# Patient Record
Sex: FEMALE | Race: WHITE | NOT HISPANIC OR LATINO | Employment: UNEMPLOYED | URBAN - METROPOLITAN AREA
[De-identification: names, ages, dates, MRNs, and addresses within clinical notes are randomized per-mention and may not be internally consistent; named-entity substitution may affect disease eponyms.]

---

## 2020-12-01 ENCOUNTER — HOSPITAL ENCOUNTER (OUTPATIENT)
Facility: MEDICAL CENTER | Age: 30
End: 2020-12-01
Attending: NURSE PRACTITIONER
Payer: MEDICAID

## 2020-12-01 ENCOUNTER — OFFICE VISIT (OUTPATIENT)
Dept: URGENT CARE | Facility: CLINIC | Age: 30
End: 2020-12-01
Payer: MEDICAID

## 2020-12-01 VITALS
HEIGHT: 63 IN | RESPIRATION RATE: 16 BRPM | TEMPERATURE: 97 F | OXYGEN SATURATION: 98 % | DIASTOLIC BLOOD PRESSURE: 88 MMHG | SYSTOLIC BLOOD PRESSURE: 122 MMHG | BODY MASS INDEX: 23.92 KG/M2 | WEIGHT: 135 LBS | HEART RATE: 89 BPM

## 2020-12-01 DIAGNOSIS — R43.2 LOSS OF TASTE: ICD-10-CM

## 2020-12-01 DIAGNOSIS — H92.03 OTALGIA OF BOTH EARS: ICD-10-CM

## 2020-12-01 DIAGNOSIS — Z20.822 SUSPECTED COVID-19 VIRUS INFECTION: ICD-10-CM

## 2020-12-01 DIAGNOSIS — R09.81 NASAL CONGESTION: ICD-10-CM

## 2020-12-01 PROCEDURE — U0003 INFECTIOUS AGENT DETECTION BY NUCLEIC ACID (DNA OR RNA); SEVERE ACUTE RESPIRATORY SYNDROME CORONAVIRUS 2 (SARS-COV-2) (CORONAVIRUS DISEASE [COVID-19]), AMPLIFIED PROBE TECHNIQUE, MAKING USE OF HIGH THROUGHPUT TECHNOLOGIES AS DESCRIBED BY CMS-2020-01-R: HCPCS

## 2020-12-01 PROCEDURE — 99203 OFFICE O/P NEW LOW 30 MIN: CPT | Mod: CS | Performed by: NURSE PRACTITIONER

## 2020-12-01 RX ORDER — FLUTICASONE PROPIONATE 50 MCG
1 SPRAY, SUSPENSION (ML) NASAL DAILY
Qty: 16 G | Refills: 0 | Status: SHIPPED | OUTPATIENT
Start: 2020-12-01 | End: 2021-08-16

## 2020-12-01 RX ORDER — IBUPROFEN 800 MG/1
800 TABLET ORAL EVERY 8 HOURS PRN
Qty: 30 TAB | Refills: 0 | Status: SHIPPED | OUTPATIENT
Start: 2020-12-01 | End: 2021-08-16

## 2020-12-01 ASSESSMENT — ENCOUNTER SYMPTOMS
MYALGIAS: 0
SHORTNESS OF BREATH: 0
FEVER: 0
NECK PAIN: 0
SORE THROAT: 1
RHINORRHEA: 1
NAUSEA: 0
HEADACHES: 1
VOMITING: 0
CHILLS: 0
COUGH: 1
DIZZINESS: 0
EYE REDNESS: 0

## 2020-12-01 NOTE — LETTER
December 1, 2020         Patient: Toan Berkowitz   YOB: 1990   Date of Visit: 12/1/2020           To Whom it May Concern:     Your employee was seen in our clinic today.  A concern for COVID-19 has been identified and testing is in progress.     We are asking you to excuse absences while following self-isolation protocol per Center for Disease Control (CDC) guidelines.  Your employee will be able to access test results through our electronic delivery system called [employer].     If the results of testing are negative, and once there has been no fever (temperature >100.4 F) for at least 72 hours without treatment, and no vomiting or diarrhea for at least 48 hours, then return to work is approved.    If the results of testing are positive then your employee will be contacted by the [facility] or [facility] department for further instructions on duration of self-isolation and return to work protocol. In general, this will also follow the CDC guidelines with a minimum of 10 days from the onset of symptoms and without fever, vomiting, or diarrhea as above.     In general, repeat testing is not necessary and not offered through our [facility].     This is the only note that will be provided from [facility] for this visit.  Your employee will require an appointment with a primary care provider if FMLA or disability forms are required.         If you have any questions please do not hesitate to call me at the phone number listed below.    Sincerely,      Dejan Lucas APRN  [phone]

## 2020-12-02 DIAGNOSIS — R09.81 NASAL CONGESTION: ICD-10-CM

## 2020-12-02 DIAGNOSIS — Z20.822 SUSPECTED COVID-19 VIRUS INFECTION: ICD-10-CM

## 2020-12-02 LAB
COVID ORDER STATUS COVID19: NORMAL
SARS-COV-2 RNA RESP QL NAA+PROBE: DETECTED
SPECIMEN SOURCE: ABNORMAL

## 2020-12-02 NOTE — PROGRESS NOTES
Subjective:   Toan Berkowitz is a 30 y.o. female who presents for Ear Pain ( double ear infection x2 days, runny nose, wet cough, no exposure)      URI   This is a new problem. Episode onset: x2 days  The problem has been unchanged. There has been no fever. Associated symptoms include congestion, coughing, ear pain, headaches, rhinorrhea and a sore throat. Pertinent negatives include no chest pain, dysuria, nausea, neck pain, rash or vomiting. Associated symptoms comments: Loss of taste. She has tried acetaminophen for the symptoms. The treatment provided no relief.       Review of Systems   Constitutional: Positive for malaise/fatigue. Negative for chills and fever.   HENT: Positive for congestion, ear pain, rhinorrhea and sore throat. Negative for ear discharge.         Loss of taste   Eyes: Negative for redness.   Respiratory: Positive for cough. Negative for shortness of breath.    Cardiovascular: Negative for chest pain.   Gastrointestinal: Negative for nausea and vomiting.   Genitourinary: Negative for dysuria.   Musculoskeletal: Negative for myalgias and neck pain.   Skin: Negative for rash.   Neurological: Positive for headaches. Negative for dizziness.       Medications:    • DAILY MULTIVITAMIN PO  • docusate sodium Caps  • ondansetron Tabs    Allergies: Vicodin [hydrocodone-acetaminophen]    Problem List: Toan Berkowitz has Closed bimalleolar fracture on their problem list.    Surgical History:  Past Surgical History:   Procedure Laterality Date   • ANKLE ORIF Left 6/12/2015    Procedure: ANKLE ORIF for: bimalleo fracture ;  Surgeon: Jan Henderson M.D.;  Location: SURGERY [facility];  Service:        Past Social Hx: Toan Berkowitz  reports that she has been smoking cigarettes. She has been smoking about 0.50 packs per day. She has never used smokeless tobacco. She reports current alcohol use. She reports that she does not use drugs.     Past Family Hx:  Toan Berkowitz family  "history is not on file.     Problem list, medications, and allergies reviewed by myself today in Epic.     Objective:     /88   Pulse 89   Temp 36.1 °C (97 °F) (Temporal)   Resp 16   Ht 1.6 m (5' 3\")   Wt 61.2 kg (135 lb)   SpO2 98%   BMI 23.91 kg/m²     Physical Exam  Vitals signs and nursing note reviewed.   Constitutional:       General: She is not in acute distress.     Appearance: She is well-developed.   HENT:      Head: Normocephalic and atraumatic.      Right Ear: External ear normal. Tympanic membrane is not erythematous or bulging.      Left Ear: External ear normal. Tympanic membrane is not erythematous or bulging.      Nose: Nose normal.      Mouth/Throat:      Mouth: Mucous membranes are moist.   Eyes:      Conjunctiva/sclera: Conjunctivae normal.   Cardiovascular:      Rate and Rhythm: Normal rate.   Pulmonary:      Effort: Pulmonary effort is normal. No respiratory distress.      Breath sounds: Normal breath sounds.   Abdominal:      General: There is no distension.   Musculoskeletal: Normal range of motion.   Skin:     General: Skin is warm and dry.   Neurological:      General: No focal deficit present.      Mental Status: She is alert and oriented to person, place, and time. Mental status is at baseline.      Gait: Gait (gait at baseline) normal.   Psychiatric:         Judgment: Judgment normal.         Assessment/Plan:     Diagnosis and associated orders:   1. Nasal congestion  COVID/SARS COV-2 PCR    fluticasone (FLONASE) 50 MCG/ACT nasal spray   2. Suspected COVID-19 virus infection  COVID/SARS COV-2 PCR   3. Loss of taste     4. Otalgia of both ears  ibuprofen (MOTRIN) 800 MG Tab        Comments/MDM:     .  Patient will be tested for COVID-19 at this time  Provided patient with self-isolation instructions  Provided ER precautions including worsening shortness of breath and high fever  Stressed the seriousness of isolation and prevention of transmissible disease  Differential " diagnosis, natural history, supportive care, and indications for immediate follow-up discussed.             Please note that this dictation was created using voice recognition software. I have made a reasonable attempt to correct obvious errors, but I expect that there are errors of grammar and possibly content that I did not discover before finalizing the note.    This note was electronically signed by Dejan JACKSON.

## 2021-08-16 ENCOUNTER — HOSPITAL ENCOUNTER (EMERGENCY)
Facility: MEDICAL CENTER | Age: 31
End: 2021-08-16
Attending: EMERGENCY MEDICINE
Payer: MEDICAID

## 2021-08-16 VITALS
WEIGHT: 216.05 LBS | HEIGHT: 62 IN | OXYGEN SATURATION: 95 % | RESPIRATION RATE: 14 BRPM | TEMPERATURE: 97.1 F | HEART RATE: 90 BPM | DIASTOLIC BLOOD PRESSURE: 99 MMHG | SYSTOLIC BLOOD PRESSURE: 171 MMHG | BODY MASS INDEX: 39.76 KG/M2

## 2021-08-16 DIAGNOSIS — R20.2 PARESTHESIAS: ICD-10-CM

## 2021-08-16 PROCEDURE — 99282 EMERGENCY DEPT VISIT SF MDM: CPT

## 2021-08-16 NOTE — ED TRIAGE NOTES
"Chief Complaint   Patient presents with   • Numbness     pt states both of her hands became numb and started to tingle today when she woke up. pt states she started a new job where she is typing and resting her elbows a lot     Pt ambulatory into triage room. Pt does not present w/ any unilateral deficits. Pt denies being dehydrated, but states she was drinking this weekend. Pt states the numbness in both hands has gotten better since walking in. Educated pt on triage process and to notify if there is any change.      /110   Pulse 91   Temp 36.2 °C (97.2 °F) (Temporal)   Resp 16   Ht 1.575 m (5' 2\")   Wt 98 kg (216 lb 0.8 oz)   LMP 07/01/2021 (Approximate)   SpO2 94%   BMI 39.52 kg/m²     "

## 2021-08-17 NOTE — ED PROVIDER NOTES
"ED Provider Note    CHIEF COMPLAINT  Chief Complaint   Patient presents with   • Numbness     pt states both of her hands became numb and started to tingle today when she woke up. pt states she started a new job where she is typing and resting her elbows a lot       HPI  Toan Berkowitz is a 31 y.o. female who presents with bilateral hand tingling.  The patient states when she woke today she started having tingling in both of her hands and felt like her thumbs were flexing bilaterally.  Subsequently she developed some tingling around her face and numbness in her face.  She does not have a headache.  She states she has been at a new job where she is been on her elbows a lot and doing a lot of typing.  Patient states she is also had some right knee pain after hitting her right knee on a rock about a week ago.  States the pain seems to be improving.  She states the pain feels deep in the right knee but does seem to be improving.  The patient has not had any associated fevers.  She has not had any vomiting.  She has had some slight diarrhea.  She is unaware of any sick contacts.    REVIEW OF SYSTEMS  See HPI for further details. All other systems are negative.     PAST MEDICAL HISTORY  Past Medical History:   Diagnosis Date   • Pain 06-11-12    left ankle, 9/10   • Headache(784.0)    • Pleurisy     6 weeks ago       FAMILY HISTORY  [unfilled]    SOCIAL HISTORY  Social History     Socioeconomic History   • Marital status: Single     Spouse name: Not on file   • Number of children: Not on file   • Years of education: Not on file   • Highest education level: Not on file   Occupational History   • Not on file   Tobacco Use   • Smoking status: Current Every Day Smoker     Packs/day: 0.50     Types: Cigarettes   • Smokeless tobacco: Never Used   Vaping Use   • Vaping Use: Never used   Substance and Sexual Activity   • Alcohol use: Yes     Comment: \"socially\"   • Drug use: No     Comment: history of meth   • Sexual " "activity: Not on file   Other Topics Concern   • Not on file   Social History Narrative   • Not on file     Social Determinants of Health     Financial Resource Strain:    • Difficulty of Paying Living Expenses:    Food Insecurity:    • Worried About Running Out of Food in the Last Year:    • Ran Out of Food in the Last Year:    Transportation Needs:    • Lack of Transportation (Medical):    • Lack of Transportation (Non-Medical):    Physical Activity:    • Days of Exercise per Week:    • Minutes of Exercise per Session:    Stress:    • Feeling of Stress :    Social Connections:    • Frequency of Communication with Friends and Family:    • Frequency of Social Gatherings with Friends and Family:    • Attends Gn[employer] Services:    • Active Member of Clubs or Organizations:    • Attends Club or Organization Meetings:    • Marital Status:    Intimate Partner Violence:    • Fear of Current or Ex-Partner:    • Emotionally Abused:    • Physically Abused:    • Sexually Abused:        SURGICAL HISTORY  Past Surgical History:   Procedure Laterality Date   • ANKLE ORIF Left 6/12/2015    Procedure: ANKLE ORIF for: bimalleo fracture ;  Surgeon: Jan Henderson M.D.;  Location: SURGERY [facility];  Service:        CURRENT MEDICATIONS  Home Medications     Reviewed by Kim Callahan R.N. (Registered Nurse) on 08/16/21 at 1358  Med List Status: Complete   Medication Last Dose Status        Patient Jareth Taking any Medications                       ALLERGIES  Allergies   Allergen Reactions   • Vicodin [Hydrocodone-Acetaminophen] Vomiting and Nausea       PHYSICAL EXAM  VITAL SIGNS: BP (!) 171/99   Pulse 90   Temp 36.2 °C (97.1 °F) (Temporal)   Resp 14   Ht 1.575 m (5' 2\")   Wt 98 kg (216 lb 0.8 oz)   LMP 07/01/2021 (Approximate)   SpO2 95%   BMI 39.52 kg/m²       Constitutional: Anxious but nontoxic  HENT: Normocephalic, Atraumatic, Bilateral external ears normal, Oropharynx moist, No oral exudates, Nose normal. "   Eyes: PERRLA, EOMI, Conjunctiva normal, No discharge.   Neck: Normal range of motion, No tenderness, Supple, No stridor.   Lymphatic: No lymphadenopathy noted.   Cardiovascular: Normal heart rate, Normal rhythm, No murmurs, No rubs, No gallops.   Thorax & Lungs: Normal breath sounds, No respiratory distress, No wheezing, No chest tenderness.   Abdomen: Bowel sounds normal, Soft, No tenderness, No masses, No pulsatile masses.   Skin: Warm, Dry, No erythema, No rash.   Back: No tenderness, No CVA tenderness.   Extremities: Intact distal pulses, No edema, No tenderness, No cyanosis, No clubbing.   Neurologic: Alert & oriented x 3, Normal motor function, Normal sensory function, No focal deficits noted.   Psychiatric: Affect normal, Judgment normal, Mood normal.     COURSE & MEDICAL DECISION MAKING  Pertinent Labs & Imaging studies reviewed. (See chart for details)  This a 31-year-old female who presents the emerge department tingling in both of her hands.  Based on the bilateral nature spinal or intracranial source would be very unlikely.  The patient states she has been typing and has been put a lot of pressure on her elbows and occasionally she has some tingling from being on her elbows.  However this is bilateral and happened this morning when she awoke.  I suspect this is from hyperventilation as she also has some facial paresthesias.  The patient is neurologically intact at this time.  She has had some slight diarrhea but not significant enough to cause any metabolic derangement to cause her presenting symptoms.  As for the knee pain I suspect this is more from a contusion.  She is ambulatory and it seems to be improving.  I will have the patient follow-up with the [facility] in 1 week if she does not have complete resolution after Motrin.  The patient will return if she is acutely worse.  Her blood pressure is elevated I suspect this is from anxiety.  She is aware this needs to be followed up on an  outpatient basis.     FINAL IMPRESSION  1.  Paresthesias  2.  Right knee pain    Disposition  The patient will be discharged in stable condition         Electronically signed by: Praveen Mohr M.D., 8/16/2021 7:55 PM

## 2021-08-17 NOTE — ED NOTES
"Pt discharged home. Pt in possession of belongings. Pt provided discharge education and information pertaining to medications and follow up appointments. Pt received copy of discharge instructions and verbalized understanding. BP (!) 171/99   Pulse 90   Temp 36.2 °C (97.1 °F) (Temporal)   Resp 14   Ht 1.575 m (5' 2\")   Wt 98 kg (216 lb 0.8 oz)   LMP 07/01/2021 (Approximate)   SpO2 95%   BMI 39.52 kg/m²     "